# Patient Record
Sex: FEMALE
[De-identification: names, ages, dates, MRNs, and addresses within clinical notes are randomized per-mention and may not be internally consistent; named-entity substitution may affect disease eponyms.]

---

## 2024-04-23 PROBLEM — Z00.00 ENCOUNTER FOR PREVENTIVE HEALTH EXAMINATION: Status: ACTIVE | Noted: 2024-04-23

## 2024-05-13 ENCOUNTER — NON-APPOINTMENT (OUTPATIENT)
Age: 34
End: 2024-05-13

## 2024-05-13 ENCOUNTER — APPOINTMENT (OUTPATIENT)
Dept: NEUROSURGERY | Facility: CLINIC | Age: 34
End: 2024-05-13

## 2024-05-14 ENCOUNTER — APPOINTMENT (OUTPATIENT)
Dept: NEUROSURGERY | Facility: CLINIC | Age: 34
End: 2024-05-14
Payer: SELF-PAY

## 2024-05-14 DIAGNOSIS — G93.2 BENIGN INTRACRANIAL HYPERTENSION: ICD-10-CM

## 2024-05-14 PROCEDURE — EDU01: CPT

## 2024-05-14 NOTE — REASON FOR VISIT
[Home] : at home, [unfilled] , at the time of the educational consult. [Medical Office: ([facility])___] : at the medical office located in  [Participant] : the participant [Self] : self [New Patient Visit] : a new patient visit [FreeTextEntry1] : IIH

## 2024-05-14 NOTE — ASSESSMENT
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 CHAVA DINH suffers from IIH with failure/ intolerance of medical management. She has the classic appearance of lateral venous sinus stenosis on imaging and based on that and her symptoms, I recommended venous sinus stenting as an appropriate minimally invasive surgical treatment. I discussed with the patient my extensive personal experience with venous sinus stenting for IIH and we also discussed the recent literature that supports venous stenting as an alternative less invasive and cheaper option to CSF shunt. Stenting also has better outcomes in carefully selected patients.   We discussed the procedure that has two components. The first part consists of catheter angiogram and manometry to assess the degree of stenosis and the trans-stenotic gradient. This part is typically performed with the patient awake. The second part consists of the stenting part and is performed under general anesthesia.  The risks, benefits and alternatives of the procedure were discussed with the patient in detail. In my personal experience, the risks are very rare, but the possibility is not zero. Risks include stroke, brain hemorrhage, any type of disability (facial or extremity weakness, facial or extremity numbness, speech difficulties, blindness) and death. There are also possible complications related to the incisions such as infection, pain, swelling and bleeding.  The patient is aware that venous sinus stenting requires dual antiplatelet therapy for 1-month post-stent (typically aspirin 325 mg daily and clopidogrel 75 mg daily) and antiplatelet monotherapy (typically aspirin 325 md daily) for additional 11 months post-stent.   Recommendations: Updated MRI and MRV Head w/wo for procedure planning Schedule Venous Sinus Stenting Will Need ASA and Plavix for 5 Days Prior to the Procedure

## 2024-05-14 NOTE — HISTORY OF PRESENT ILLNESS
[de-identified] : Ms. DINH is a pleasant 33 year old female who presents today with a chief complaint of idiopathic intracranial hypertension.  She was diagnosed in 11/2022 during work up of TVOs, diplopia and positional headaches.  She had a lumbar puncture with an opening pressure "in the 20s".  She needed a blood patch post LP.     Currently: Meds - Diamox 500mg-1000mg daily; Which she is tolerating well. Pulsatile Tinnitus - RIGHT ear Headaches - positional, worse with laying flat and in the morning Vision - + Papilledema at last visit 2/2024  She states that she has tried to wean off Diamox at least twice over the last 2 years but headaches and vision symptoms return.

## 2024-09-09 RX ORDER — CLOPIDOGREL BISULFATE 75 MG/1
75 TABLET, FILM COATED ORAL DAILY
Qty: 40 | Refills: 0 | Status: ACTIVE | COMMUNITY
Start: 2024-09-09 | End: 1900-01-01

## 2024-09-09 RX ORDER — ASPIRIN 325 MG/1
325 TABLET, FILM COATED ORAL DAILY
Qty: 90 | Refills: 2 | Status: ACTIVE | COMMUNITY
Start: 2024-09-09 | End: 1900-01-01

## 2024-09-09 RX ORDER — ACETAZOLAMIDE 250 MG
TABLET ORAL
Refills: 0 | Status: ACTIVE | COMMUNITY

## 2024-09-16 ENCOUNTER — NON-APPOINTMENT (OUTPATIENT)
Age: 34
End: 2024-09-16

## 2024-09-17 ENCOUNTER — APPOINTMENT (OUTPATIENT)
Dept: NEUROSURGERY | Facility: CLINIC | Age: 34
End: 2024-09-17
Payer: COMMERCIAL

## 2024-09-17 ENCOUNTER — OUTPATIENT (OUTPATIENT)
Dept: OUTPATIENT SERVICES | Facility: HOSPITAL | Age: 34
LOS: 1 days | End: 2024-09-17
Payer: COMMERCIAL

## 2024-09-17 ENCOUNTER — NON-APPOINTMENT (OUTPATIENT)
Age: 34
End: 2024-09-17

## 2024-09-17 VITALS
RESPIRATION RATE: 18 BRPM | SYSTOLIC BLOOD PRESSURE: 140 MMHG | OXYGEN SATURATION: 99 % | HEIGHT: 63 IN | HEART RATE: 98 BPM | TEMPERATURE: 98 F | WEIGHT: 130.07 LBS | DIASTOLIC BLOOD PRESSURE: 80 MMHG

## 2024-09-17 VITALS
SYSTOLIC BLOOD PRESSURE: 127 MMHG | WEIGHT: 130 LBS | HEIGHT: 63 IN | OXYGEN SATURATION: 98 % | BODY MASS INDEX: 23.04 KG/M2 | HEART RATE: 86 BPM | DIASTOLIC BLOOD PRESSURE: 88 MMHG

## 2024-09-17 DIAGNOSIS — G93.2 BENIGN INTRACRANIAL HYPERTENSION: ICD-10-CM

## 2024-09-17 DIAGNOSIS — Z90.711 ACQUIRED ABSENCE OF UTERUS WITH REMAINING CERVICAL STUMP: Chronic | ICD-10-CM

## 2024-09-17 DIAGNOSIS — Z98.890 OTHER SPECIFIED POSTPROCEDURAL STATES: Chronic | ICD-10-CM

## 2024-09-17 LAB
ANION GAP SERPL CALC-SCNC: 15 MMOL/L — SIGNIFICANT CHANGE UP (ref 5–17)
APTT BLD: 33 SEC — SIGNIFICANT CHANGE UP (ref 24.5–35.6)
BLD GP AB SCN SERPL QL: POSITIVE — SIGNIFICANT CHANGE UP
BUN SERPL-MCNC: 17 MG/DL — SIGNIFICANT CHANGE UP (ref 7–23)
CALCIUM SERPL-MCNC: 9.4 MG/DL — SIGNIFICANT CHANGE UP (ref 8.4–10.5)
CHLORIDE SERPL-SCNC: 106 MMOL/L — SIGNIFICANT CHANGE UP (ref 96–108)
CO2 SERPL-SCNC: 20 MMOL/L — LOW (ref 22–31)
CREAT SERPL-MCNC: 0.7 MG/DL — SIGNIFICANT CHANGE UP (ref 0.5–1.3)
EGFR: 116 ML/MIN/1.73M2 — SIGNIFICANT CHANGE UP
GLUCOSE SERPL-MCNC: 96 MG/DL — SIGNIFICANT CHANGE UP (ref 70–99)
HCT VFR BLD CALC: 41 % — SIGNIFICANT CHANGE UP (ref 34.5–45)
HGB BLD-MCNC: 13.9 G/DL — SIGNIFICANT CHANGE UP (ref 11.5–15.5)
INR BLD: 0.94 RATIO — SIGNIFICANT CHANGE UP (ref 0.85–1.18)
MCHC RBC-ENTMCNC: 29.6 PG — SIGNIFICANT CHANGE UP (ref 27–34)
MCHC RBC-ENTMCNC: 33.9 GM/DL — SIGNIFICANT CHANGE UP (ref 32–36)
MCV RBC AUTO: 87.4 FL — SIGNIFICANT CHANGE UP (ref 80–100)
NRBC # BLD: 0 /100 WBCS — SIGNIFICANT CHANGE UP (ref 0–0)
PLATELET # BLD AUTO: 309 K/UL — SIGNIFICANT CHANGE UP (ref 150–400)
POTASSIUM SERPL-MCNC: 3.5 MMOL/L — SIGNIFICANT CHANGE UP (ref 3.5–5.3)
POTASSIUM SERPL-SCNC: 3.5 MMOL/L — SIGNIFICANT CHANGE UP (ref 3.5–5.3)
PROTHROM AB SERPL-ACNC: 10.4 SEC — SIGNIFICANT CHANGE UP (ref 9.5–13)
RBC # BLD: 4.69 M/UL — SIGNIFICANT CHANGE UP (ref 3.8–5.2)
RBC # FLD: 12.4 % — SIGNIFICANT CHANGE UP (ref 10.3–14.5)
RH IG SCN BLD-IMP: NEGATIVE — SIGNIFICANT CHANGE UP
SODIUM SERPL-SCNC: 141 MMOL/L — SIGNIFICANT CHANGE UP (ref 135–145)
WBC # BLD: 7.1 K/UL — SIGNIFICANT CHANGE UP (ref 3.8–10.5)
WBC # FLD AUTO: 7.1 K/UL — SIGNIFICANT CHANGE UP (ref 3.8–10.5)

## 2024-09-17 PROCEDURE — 85730 THROMBOPLASTIN TIME PARTIAL: CPT

## 2024-09-17 PROCEDURE — 85610 PROTHROMBIN TIME: CPT

## 2024-09-17 PROCEDURE — 86880 COOMBS TEST DIRECT: CPT

## 2024-09-17 PROCEDURE — 85027 COMPLETE CBC AUTOMATED: CPT

## 2024-09-17 PROCEDURE — 86922 COMPATIBILITY TEST ANTIGLOB: CPT

## 2024-09-17 PROCEDURE — 86077 PHYS BLOOD BANK SERV XMATCH: CPT

## 2024-09-17 PROCEDURE — 86900 BLOOD TYPING SEROLOGIC ABO: CPT

## 2024-09-17 PROCEDURE — 86850 RBC ANTIBODY SCREEN: CPT

## 2024-09-17 PROCEDURE — 80048 BASIC METABOLIC PNL TOTAL CA: CPT

## 2024-09-17 PROCEDURE — 86901 BLOOD TYPING SEROLOGIC RH(D): CPT

## 2024-09-17 PROCEDURE — 86870 RBC ANTIBODY IDENTIFICATION: CPT

## 2024-09-17 PROCEDURE — G0463: CPT

## 2024-09-17 PROCEDURE — 99203 OFFICE O/P NEW LOW 30 MIN: CPT

## 2024-09-17 PROCEDURE — 86905 BLOOD TYPING RBC ANTIGENS: CPT

## 2024-09-17 PROCEDURE — 36415 COLL VENOUS BLD VENIPUNCTURE: CPT

## 2024-09-17 NOTE — H&P PST ADULT - NSICDXPASTMEDICALHX_GEN_ALL_CORE_FT
PAST MEDICAL HISTORY:  Ectopic pregnancy     History of TMJ disorder     Torsion of left ovary and ovarian pedicle

## 2024-09-17 NOTE — H&P PST ADULT - NEGATIVE ENMT SYMPTOMS
Health Maintenance Due   Topic Date Due   • Pneumococcal Vaccine 0-64 (1 of 1 - PPSV23) 10/11/1997   • Influenza Vaccine (1) 09/01/2020   • Cervical Cancer Screening  12/23/2020       Patient is due for topics as listed above but is not proceeding with Immunization(s) Influenza and Pneumococcal and Cervical cancer screening at this time. Will schedule her cervical cancer screening for the end of December.           no nasal obstruction/no post-nasal discharge/no nose bleeds/no gum bleeding/no dry mouth/no throat pain/no dysphagia

## 2024-09-17 NOTE — HISTORY OF PRESENT ILLNESS
[de-identified] : Ms. DINH is a pleasant 34 year old female who presents today with a chief complaint of idiopathic intracranial hypertension.  She was diagnosed in 11/2022 during work up of TVOs, diplopia and positional headaches.  She had a lumbar puncture with an opening pressure "in the 20s".  She needed a blood patch post LP.     Currently: Meds - Diamox 500mg-1000mg daily; Which she is tolerating well. Pulsatile Tinnitus - RIGHT ear Headaches - positional, worse with laying flat and in the morning Vision - + Papilledema at last visit 2/2024  She states that she has tried to wean off Diamox at least twice over the last 2 years but headaches and vision symptoms return.

## 2024-09-17 NOTE — HISTORY OF PRESENT ILLNESS
[de-identified] : Ms. DINH is a pleasant 34 year old female who presents today with a chief complaint of idiopathic intracranial hypertension.  She was diagnosed in 11/2022 during work up of TVOs, diplopia and positional headaches.  She had a lumbar puncture with an opening pressure "in the 20s".  She needed a blood patch post LP.     Currently: Meds - Diamox 500mg-1000mg daily; Which she is tolerating well. Pulsatile Tinnitus - RIGHT ear Headaches - positional, worse with laying flat and in the morning Vision - + Papilledema at last visit 2/2024  She states that she has tried to wean off Diamox at least twice over the last 2 years but headaches and vision symptoms return.

## 2024-09-17 NOTE — H&P PST ADULT - PROBLEM SELECTOR PLAN 1
Venous Sinus stenting   CBC BMP T&S PT PTT INR  Instructions diet and chlorhexidine soap provided and reviewed   All questions answered   Will continue on aspirin and plavix throughout the jeremie op period as instructed by Dr Reynoso

## 2024-09-17 NOTE — ASSESSMENT
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 CHAVA DINH suffers from IIH with failure/ intolerance of medical management. She has the classic appearance of lateral venous sinus stenosis on imaging and based on that and her symptoms, I recommended venous sinus stenting as an appropriate minimally invasive surgical treatment. I discussed with the patient my extensive personal experience with venous sinus stenting for IIH and we also discussed the recent literature that supports venous stenting as an alternative less invasive and cheaper option to CSF shunt. Stenting also has better outcomes in carefully selected patients.   We discussed the procedure that has two components. The first part consists of catheter angiogram and manometry to assess the degree of stenosis and the trans-stenotic gradient. This part is typically performed with the patient awake. The second part consists of the stenting part and is performed under general anesthesia.  The risks, benefits and alternatives of the procedure were discussed with the patient in detail. In my personal experience, the risks are very rare, but the possibility is not zero. Risks include stroke, brain hemorrhage, any type of disability (facial or extremity weakness, facial or extremity numbness, speech difficulties, blindness) and death. There are also possible complications related to the incisions such as infection, pain, swelling and bleeding.  The patient is aware that venous sinus stenting requires dual antiplatelet therapy for 1-month post-stent (typically aspirin 325 mg daily and clopidogrel 75 mg daily) and antiplatelet monotherapy (typically aspirin 325 md daily) for additional 11 months post-stent.   Plan: Venous Sinus Stenting Tomorrow Continue ASA and Plavix for 5 Days Prior to the Procedure
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 CHAVA DINH suffers from IIH with failure/ intolerance of medical management. She has the classic appearance of lateral venous sinus stenosis on imaging and based on that and her symptoms, I recommended venous sinus stenting as an appropriate minimally invasive surgical treatment. I discussed with the patient my extensive personal experience with venous sinus stenting for IIH and we also discussed the recent literature that supports venous stenting as an alternative less invasive and cheaper option to CSF shunt. Stenting also has better outcomes in carefully selected patients.   We discussed the procedure that has two components. The first part consists of catheter angiogram and manometry to assess the degree of stenosis and the trans-stenotic gradient. This part is typically performed with the patient awake. The second part consists of the stenting part and is performed under general anesthesia.  The risks, benefits and alternatives of the procedure were discussed with the patient in detail. In my personal experience, the risks are very rare, but the possibility is not zero. Risks include stroke, brain hemorrhage, any type of disability (facial or extremity weakness, facial or extremity numbness, speech difficulties, blindness) and death. There are also possible complications related to the incisions such as infection, pain, swelling and bleeding.  The patient is aware that venous sinus stenting requires dual antiplatelet therapy for 1-month post-stent (typically aspirin 325 mg daily and clopidogrel 75 mg daily) and antiplatelet monotherapy (typically aspirin 325 md daily) for additional 11 months post-stent.   Plan: Venous Sinus Stenting Tomorrow Continue ASA and Plavix for 5 Days Prior to the Procedure
[id number]

## 2024-09-17 NOTE — H&P PST ADULT - NSICDXFAMILYHX_GEN_ALL_CORE_FT
FAMILY HISTORY:  Mother  Still living? Yes, Estimated age: Age Unknown  FH: coronary artery disease, Age at diagnosis: Age Unknown

## 2024-09-17 NOTE — H&P PST ADULT - HISTORY OF PRESENT ILLNESS
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   PMHx partial hysterectomy, placenta accreta, left ovarian torsion, TMJ (surgery 2007), ectopic pregnancy 2018, breast augmentation, Lasix eye.    Patient feels fine - chest pain sob / fevers / chills.   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 Patient has a history of idiopathic intracranial hypertension, diagnosed in 2022 after experiencing positional headaches. Experiences pulsatile tinnitus in the right ear with headaches that worsen when she changes positions. 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 PMHx includes partial hysterectomy (2019), placenta accreta (required blood transfusions s/p delivery 2016 & 2019 - laisha antibodies), left ovarian torsion, TMJ (surgery 2007), ectopic pregnancy (2018), breast augmentation and Lasix eye. Patient was prescribed 325 aspirin and 75 mg Plavix prior to procedure and was instructed to continue throughout the jeremie-op period.  Patient feels "fine" today denies - chest pain sob / fevers / chills.             ?  Currently:  Meds - Diamox 500mg-1000mg daily; Which she is tolerating well.  Pulsatile Tinnitus - RIGHT ear  Headaches - positional, worse with laying flat and in the morning  Vision - + Papilledema at last visit 2/2024  ?  She states that she has tried to wean off Diamox at least twice over the last 2 years but headaches and vision symptoms return.

## 2024-09-17 NOTE — H&P PST ADULT - FUNCTIONAL STATUS
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

## 2024-09-17 NOTE — H&P PST ADULT - ASSESSMENT
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     CAPRINI SCORE    AGE RELATED RISK FACTORS                                                             [ ] Age 41-60 years                                            (1 Point)  [ ] Age: 61-74 years                                           (2 Points)                 [ ] Age= 75 years                                                (3 Points)             DISEASE RELATED RISK FACTORS                                                       [ ] Edema in the lower extremities                 (1 Point)                     [ ] Varicose veins                                               (1 Point)                                 [ ] BMI > 25 Kg/m2                                            (1 Point)                                  [ ] Serious infection (ie PNA)                            (1 Point)                     [ ] Lung disease ( COPD, Emphysema)            (1 Point)                                                                          [ ] Acute myocardial infarction                         (1 Point)                  [ ] Congestive heart failure (in the previous month)  (1 Point)         [ ] Inflammatory bowel disease                            (1 Point)                  [ ] Central venous access, PICC or Port               (2 points)       (within the last month)                                                                [ ] Stroke (in the previous month)                        (5 Points)    [ ] Previous or present malignancy                       (2 points)                                                                                                                                                         HEMATOLOGY RELATED FACTORS                                                         [ ] Prior episodes of VTE                                     (3 Points)                     [ ] Positive family history for VTE                      (3 Points)                  [ ] Prothrombin 53878 A                                     (3 Points)                     [ ] Factor V Leiden                                                (3 Points)                        [ ] Lupus anticoagulants                                      (3 Points)                                                           [ ] Anticardiolipin antibodies                              (3 Points)                                                       [ ] High homocysteine in the blood                   (3 Points)                                             [ ] Other congenital or acquired thrombophilia      (3 Points)                                                [ ] Heparin induced thrombocytopenia                  (3 Points)                                        MOBILITY RELATED FACTORS  [ ] Bed rest                                                         (1 Point)  [ ] Plaster cast                                                    (2 points)  [ ] Bed bound for more than 72 hours           (2 Points)    GENDER SPECIFIC FACTORS  [ ] Pregnancy or had a baby within the last month   (1 Point)  [ ] Post-partum < 6 weeks                                   (1 Point)  [ ] Hormonal therapy  or oral contraception   (1 Point)  [ ] History of pregnancy complications              (1 point)  [ ] Unexplained or recurrent              (1 Point)    OTHER RISK FACTORS                                           (1 Point)  [ ] BMI >40, smoking, diabetes requiring insulin, chemotherapy  blood transfusions and length of surgery over 2 hours    SURGERY RELATED RISK FACTORS  [ ]  Section within the last month     (1 Point)  [ ] Minor surgery                                                  (1 Point)  [ ] Arthroscopic surgery                                       (2 Points)  [ ] Planned major surgery lasting more            (2 Points)      than 45 minutes     [ ] Elective hip or knee joint replacement       (5 points)       surgery                                                TRAUMA RELATED RISK FACTORS  [ ] Fracture of the hip, pelvis, or leg                       (5 Points)  [ ] Spinal cord injury resulting in paralysis             (5 points)       (in the previous month)    [ ] Paralysis  (less than 1 month)                             (5 Points)  [ ] Multiple Trauma within 1 month                        (5 Points)    Total Score [        ]    Caprini Score 0-2: Low Risk, NO VTE prophylaxis required for most patients, encourage ambulation  Caprini Score 3-6: Moderate Risk , pharmacologic VTE prophylaxis is indicated for most patients (in the absence of contraindications)  Caprini Score Greater than or =7: High risk, pharmocologic VTE prophylaxis indicated for most patients (in the absence of contraindications)                                 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     CAPRINI SCORE    AGE RELATED RISK FACTORS                                                             [ ] Age 41-60 years                                            (1 Point)  [ ] Age: 61-74 years                                           (2 Points)                 [ ] Age= 75 years                                                (3 Points)             DISEASE RELATED RISK FACTORS                                                       [ ] Edema in the lower extremities                 (1 Point)                     [ ] Varicose veins                                               (1 Point)                                 [ ] BMI > 25 Kg/m2                                            (1 Point)                                  [ ] Serious infection (ie PNA)                            (1 Point)                     [ ] Lung disease ( COPD, Emphysema)            (1 Point)                                                                          [ ] Acute myocardial infarction                         (1 Point)                  [ ] Congestive heart failure (in the previous month)  (1 Point)         [ ] Inflammatory bowel disease                            (1 Point)                  [ ] Central venous access, PICC or Port               (2 points)       (within the last month)                                                                [ ] Stroke (in the previous month)                        (5 Points)    [ ] Previous or present malignancy                       (2 points)                                                                                                                                                         HEMATOLOGY RELATED FACTORS                                                         [ ] Prior episodes of VTE                                     (3 Points)                     [ ] Positive family history for VTE                      (3 Points)                  [ ] Prothrombin 01409 A                                     (3 Points)                     [ ] Factor V Leiden                                                (3 Points)                        [ ] Lupus anticoagulants                                      (3 Points)                                                           [ ] Anticardiolipin antibodies                              (3 Points)                                                       [ ] High homocysteine in the blood                   (3 Points)                                             [ ] Other congenital or acquired thrombophilia      (3 Points)                                                [ ] Heparin induced thrombocytopenia                  (3 Points)                                        MOBILITY RELATED FACTORS  [ ] Bed rest                                                         (1 Point)  [ ] Plaster cast                                                    (2 points)  [ ] Bed bound for more than 72 hours           (2 Points)    GENDER SPECIFIC FACTORS  [ ] Pregnancy or had a baby within the last month   (1 Point)  [ ] Post-partum < 6 weeks                                   (1 Point)  [ ] Hormonal therapy  or oral contraception   (1 Point)  [ ] History of pregnancy complications              (1 point)  [ ] Unexplained or recurrent              (1 Point)    OTHER RISK FACTORS                                           (1 Point)  [ ] BMI >40, smoking, diabetes requiring insulin, chemotherapy  blood transfusions and length of surgery over 2 hours    SURGERY RELATED RISK FACTORS  [ ]  Section within the last month     (1 Point)  [ ] Minor surgery                                                  (1 Point)  [ ] Arthroscopic surgery                                       (2 Points)  [ x] Planned major surgery lasting more            (2 Points)      than 45 minutes     [ ] Elective hip or knee joint replacement       (5 points)       surgery                                                TRAUMA RELATED RISK FACTORS  [ ] Fracture of the hip, pelvis, or leg                       (5 Points)  [ ] Spinal cord injury resulting in paralysis             (5 points)       (in the previous month)    [ ] Paralysis  (less than 1 month)                             (5 Points)  [ ] Multiple Trauma within 1 month                        (5 Points)    Total Score [  2      ]    Caprini Score 0-2: Low Risk, NO VTE prophylaxis required for most patients, encourage ambulation  Caprini Score 3-6: Moderate Risk , pharmacologic VTE prophylaxis is indicated for most patients (in the absence of contraindications)  Caprini Score Greater than or =7: High risk, pharmocologic VTE prophylaxis indicated for most patients (in the absence of contraindications)

## 2024-09-18 ENCOUNTER — APPOINTMENT (OUTPATIENT)
Dept: NEUROSURGERY | Facility: HOSPITAL | Age: 34
End: 2024-09-18

## 2024-09-18 ENCOUNTER — OUTPATIENT (OUTPATIENT)
Dept: INPATIENT UNIT | Facility: HOSPITAL | Age: 34
LOS: 1 days | End: 2024-09-18
Payer: COMMERCIAL

## 2024-09-18 ENCOUNTER — TRANSCRIPTION ENCOUNTER (OUTPATIENT)
Age: 34
End: 2024-09-18

## 2024-09-18 VITALS
DIASTOLIC BLOOD PRESSURE: 82 MMHG | WEIGHT: 130.07 LBS | RESPIRATION RATE: 18 BRPM | TEMPERATURE: 98 F | SYSTOLIC BLOOD PRESSURE: 119 MMHG | HEART RATE: 88 BPM | HEIGHT: 63 IN | OXYGEN SATURATION: 99 %

## 2024-09-18 VITALS
RESPIRATION RATE: 11 BRPM | OXYGEN SATURATION: 98 % | SYSTOLIC BLOOD PRESSURE: 93 MMHG | HEART RATE: 85 BPM | DIASTOLIC BLOOD PRESSURE: 66 MMHG

## 2024-09-18 DIAGNOSIS — G93.2 BENIGN INTRACRANIAL HYPERTENSION: ICD-10-CM

## 2024-09-18 DIAGNOSIS — Z90.711 ACQUIRED ABSENCE OF UTERUS WITH REMAINING CERVICAL STUMP: Chronic | ICD-10-CM

## 2024-09-18 DIAGNOSIS — Z98.890 OTHER SPECIFIED POSTPROCEDURAL STATES: Chronic | ICD-10-CM

## 2024-09-18 PROCEDURE — 86901 BLOOD TYPING SEROLOGIC RH(D): CPT

## 2024-09-18 PROCEDURE — 36226 PLACE CATH VERTEBRAL ART: CPT

## 2024-09-18 PROCEDURE — C1876: CPT

## 2024-09-18 PROCEDURE — 86900 BLOOD TYPING SEROLOGIC ABO: CPT

## 2024-09-18 PROCEDURE — C1769: CPT

## 2024-09-18 PROCEDURE — C1887: CPT

## 2024-09-18 PROCEDURE — C1894: CPT

## 2024-09-18 PROCEDURE — 86850 RBC ANTIBODY SCREEN: CPT

## 2024-09-18 PROCEDURE — 75870 VEIN X-RAY SKULL: CPT

## 2024-09-18 PROCEDURE — C1760: CPT

## 2024-09-18 PROCEDURE — 36223 PLACE CATH CAROTID/INOM ART: CPT

## 2024-09-18 PROCEDURE — 37238 OPEN/PERQ PLACE STENT SAME: CPT

## 2024-09-18 PROCEDURE — 36012 PLACE CATHETER IN VEIN: CPT

## 2024-09-18 PROCEDURE — C1757: CPT

## 2024-09-18 RX ORDER — FLU VACCINE TS 2012-2013(5YR+) 45MCG/.5ML
0.5 VIAL (ML) INTRAMUSCULAR ONCE
Refills: 0 | Status: DISCONTINUED | OUTPATIENT
Start: 2024-09-18 | End: 2024-09-18

## 2024-09-18 RX ORDER — METHYLPREDNISOLONE 4 MG
1 TABLET ORAL
Qty: 21 | Refills: 0
Start: 2024-09-18

## 2024-09-18 RX ORDER — ASPIRIN 81 MG
1 TABLET, DELAYED RELEASE (ENTERIC COATED) ORAL
Refills: 0 | DISCHARGE

## 2024-09-18 RX ORDER — OXYCODONE AND ACETAMINOPHEN 7.5; 325 MG/1; MG/1
1 TABLET ORAL
Qty: 16 | Refills: 0
Start: 2024-09-18 | End: 2024-09-21

## 2024-09-18 RX ORDER — OXYCODONE HYDROCHLORIDE 5 MG/1
1 TABLET ORAL
Qty: 10 | Refills: 0
Start: 2024-09-18

## 2024-09-18 RX ORDER — KETOROLAC TROMETHAMINE 30 MG/ML
30 INJECTION, SOLUTION INTRAMUSCULAR ONCE
Refills: 0 | Status: DISCONTINUED | OUTPATIENT
Start: 2024-09-18 | End: 2024-09-18

## 2024-09-18 RX ORDER — ACETAZOLAMIDE 250 MG
1 TABLET ORAL
Refills: 0 | DISCHARGE

## 2024-09-18 RX ORDER — HYDROMORPHONE HYDROCHLORIDE 2 MG/1
0.2 TABLET ORAL ONCE
Refills: 0 | Status: DISCONTINUED | OUTPATIENT
Start: 2024-09-18 | End: 2024-09-18

## 2024-09-18 RX ADMIN — HYDROMORPHONE HYDROCHLORIDE 0.2 MILLIGRAM(S): 2 TABLET ORAL at 11:34

## 2024-09-18 RX ADMIN — KETOROLAC TROMETHAMINE 30 MILLIGRAM(S): 30 INJECTION, SOLUTION INTRAMUSCULAR at 11:40

## 2024-09-18 RX ADMIN — KETOROLAC TROMETHAMINE 30 MILLIGRAM(S): 30 INJECTION, SOLUTION INTRAMUSCULAR at 10:15

## 2024-09-18 NOTE — CHART NOTE - NSCHARTNOTEFT_GEN_A_CORE
Interventional Neuro Radiology  Pre-Procedure Note    This is a 34 year old female with history of idiopathic intracranial hypertension, diagnosed in 2022 after experiencing positional headaches. Experiences pulsatile tinnitus in the right ear with headaches that worsen when she changes positions. 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 PMHx includes partial hysterectomy (2019), placenta accreta (required blood transfusions s/p delivery 2016 & 2019 - laisha antibodies), left ovarian torsion, TMJ (surgery 2007), ectopic pregnancy (2018), breast augmentation and Lasix eye. Patient on 325 aspirin and 75 mg Plavix.     Neuro Exam: Awake and alert, oriented x3, fluent, normal naming and repetition, follows 3 step commands. Extraocular movements intact, no nystagmus, visual fields full, face symmetric, tongue midline. No drift, 5/5 power x 4 extremities. Normal sensation to LT. Normal finger-to-nose and rapid alternating movements.    PAST MEDICAL & SURGICAL HISTORY:  History of TMJ disorder  Ectopic pregnancy  Torsion of left ovary and ovarian pedicle  History of augmentation of both breasts  S/P partial hysterectomy      Social History:   Denies tobacco use    FAMILY HISTORY:  No pertinent family history    Allergies:   penicillin (Anaphylaxis)      Current Medications:   influenza   Vaccine 0.5 milliLiter(s) IntraMuscular once  · 	Diamox Sequels 500 mg oral capsule, extended release: Last Dose Taken:  , 1 cap(s) orally 2 times a day  · 	aspirin 325 mg oral tablet: Last Dose Taken:  , 1 tab(s) orally once a day 9/13  · 	Plavix 75 mg oral tablet: Last Dose Taken:  , 1 tab(s) orally once a day since 9/13      Labs:                         13.9   7.10  )-----------( 309      ( 17 Sep 2024 17:05 )             41.0       09-17    141  |  106  |  17  ----------------------------<  96  3.5   |  20[L]  |  0.70    Ca    9.4      17 Sep 2024 17:05          HCG: neg.     Blood Bank: 09-17-24  B  --  Negative      Assessment/Plan:   This is a 35yo female  presents with IIH. Patient presents to neuro-IR for selective cerebral venogram, angiography and VSS. Procedure/ risks/ benefits/ goals/ alternatives were explained. Risks include but are not limited to stroke/ vessel injury/ hemorrhage/ groin hematoma. All questions answered. Informed content obtained from patient. Consent placed in chart.       Daniela Hlae PA-C  [phone]

## 2024-09-18 NOTE — PRE-ANESTHESIA EVALUATION ADULT - NSANTHAIRWAYFT_ENT_ALL_CORE
Quality 111:Pneumonia Vaccination Status For Older Adults: Pneumococcal Vaccination Previously Received Detail Level: Detailed Quality 474: Zoster Vaccination Status: Shingrix Vaccination Administered or Previously Received No limitations in mouth opening  Neck FROM  TMD > 3FB

## 2024-09-18 NOTE — ASU DISCHARGE PLAN (ADULT/PEDIATRIC) - ACCOMPANIED BY
Family Alert and oriented to person, place, time/situation. normal mood and affect. no apparent risk to self or others.

## 2024-09-18 NOTE — CHART NOTE - NSCHARTNOTEFT_GEN_A_CORE
Interventional Neuro- Radiology   Procedure Note      Procedure: Selective Cerebral Angiography/ Venography/ Manometry/ Venous Sinus Stenting  Pre- Procedure Diagnosis: Venous sinus stenosis   Post- Procedure Diagnosis:     : Dr. Angeles MD  Fellow: Dr. Christine Randhawa   Physician Assistant: Daniela Hale PA-C    RN: Jada   Tech:     Anesthesia: Dr. Jain  (general anesthesia)    I/Os:  Fluids:  Yin: DTV   Contrast:  Estimated Blood Loss: <10cc    Preliminary Report:  Under general anesthesia, using a 8Fr short sheath to the right groin/ using a 5/4 sheath to the right radial artery examination of superior sagittal sinus, right transverse sinus, right sigmoid sinus via selective cerebral angiography/venography/manometry were performed and demonstrated ________. (Official note to follow).    Patient tolerated procedure well, vital signs stable, hemodynamically stable, no change in neurological status compared to baseline. Results discussed with neurosurgery/ patient and their family.  Radial sheath d/c'd, radial quickclot dressing placed at ____h; no bleeding, no hematoma. Patient transferred to IR recovery for further care/ monitoring.       Vascular access site:  Ultrasound guidance- yes  Fluoroscopy before procedure- yes   Fluoroscopy to confirm correct needle position after puncture and before advancing sheath- yes  Femoral artery angiography before sheath removal- yes  Closure device used- Vascade  Closure device appropriately deployed- yes  Manual pressure- held for 10minutes until hemostasis, no active bleeding or hematoma  Safeguard dressing applied- yes, applied at ___h. Interventional Neuro- Radiology   Procedure Note      Procedure: Selective Cerebral Angiography/ Venography/ Manometry/ Venous Sinus Stenting  Pre- Procedure Diagnosis: Venous sinus stenosis   Post- Procedure Diagnosis:  Right transverse sigmoid sinus tenting x2      : Dr. Angeles MD  Fellow: Dr. King   Physician Assistant: Daniela Hale PA-C    RN: Jada   Tech:  Rose     Anesthesia: Dr. Jain  (general anesthesia)    I/Os:  Fluids: 600cc   Yin: DTV   Contrast: 65cc   Estimated Blood Loss: <10cc    Preliminary Report:  Under general anesthesia, using a 8Fr short sheath to the right groin/ using a 5/4 sheath to the right radial artery examination of superior sagittal sinus, right transverse sinus, right sigmoid sinus via selective cerebral angiography/venography/manometry were performed, Right transverse sigmoid sinus tenting x2 . (Official note to follow).    Patient tolerated procedure well, vital signs stable, hemodynamically stable, no change in neurological status compared to baseline. Results discussed with neurosurgery/ patient and their family.  Radial sheath d/c'd, radial quickclot dressing placed at 940h; no bleeding, no hematoma. Patient transferred to IR recovery for further care/ monitoring.       Vascular access site:  Ultrasound guidance- yes  Fluoroscopy before procedure- yes   Fluoroscopy to confirm correct needle position after puncture and before advancing sheath- yes  Closure device used- Vascade  Closure device appropriately deployed- yes  Manual pressure- held for 10minutes until hemostasis, no active bleeding or hematoma  Safeguard dressing applied- yes, applied at 940h.

## 2024-09-18 NOTE — ASU DISCHARGE PLAN (ADULT/PEDIATRIC) - NS MD DC FALL RISK RISK
For information on Fall & Injury Prevention, visit: [email or web address] OR  [email or web address] OR  https://www.cdc.gov/steadi/patient.html

## 2024-09-18 NOTE — ASU DISCHARGE PLAN (ADULT/PEDIATRIC) - CARE PROVIDER_API CALL
Keisha Reynoso  Radiology  [address], [address]  [place] [address]  Phone: [phone]  Fax: [phone]  Follow Up Time:

## 2024-09-19 ENCOUNTER — APPOINTMENT (OUTPATIENT)
Dept: NEUROSURGERY | Facility: CLINIC | Age: 34
End: 2024-09-19
Payer: COMMERCIAL

## 2024-09-19 VITALS
DIASTOLIC BLOOD PRESSURE: 74 MMHG | HEART RATE: 77 BPM | HEIGHT: 63 IN | SYSTOLIC BLOOD PRESSURE: 106 MMHG | OXYGEN SATURATION: 99 % | BODY MASS INDEX: 23.04 KG/M2 | WEIGHT: 130 LBS

## 2024-09-19 DIAGNOSIS — Z78.9 OTHER SPECIFIED HEALTH STATUS: ICD-10-CM

## 2024-09-19 DIAGNOSIS — Z82.49 FAMILY HISTORY OF ISCHEMIC HEART DISEASE AND OTHER DISEASES OF THE CIRCULATORY SYSTEM: ICD-10-CM

## 2024-09-19 DIAGNOSIS — G93.2 BENIGN INTRACRANIAL HYPERTENSION: ICD-10-CM

## 2024-09-19 DIAGNOSIS — Z87.898 PERSONAL HISTORY OF OTHER SPECIFIED CONDITIONS: ICD-10-CM

## 2024-09-19 DIAGNOSIS — Z83.49 FAMILY HISTORY OF OTHER ENDOCRINE, NUTRITIONAL AND METABOLIC DISEASES: ICD-10-CM

## 2024-09-19 PROBLEM — N83.512 TORSION OF LEFT OVARY AND OVARIAN PEDICLE: Chronic | Status: ACTIVE | Noted: 2024-09-17

## 2024-09-19 PROBLEM — Z87.39 PERSONAL HISTORY OF OTHER DISEASES OF THE MUSCULOSKELETAL SYSTEM AND CONNECTIVE TISSUE: Chronic | Status: ACTIVE | Noted: 2024-09-17

## 2024-09-19 PROCEDURE — 99213 OFFICE O/P EST LOW 20 MIN: CPT

## 2024-09-19 RX ORDER — ACETAZOLAMIDE 500 MG/1
500 CAPSULE ORAL
Qty: 180 | Refills: 0 | Status: ACTIVE | COMMUNITY
Start: 2024-06-03

## 2024-09-19 RX ORDER — DICLOFENAC SODIUM 75 MG/1
75 TABLET, DELAYED RELEASE ORAL
Qty: 20 | Refills: 0 | Status: ACTIVE | COMMUNITY
Start: 2024-06-11

## 2024-09-19 RX ORDER — HYDROCODONE BITARTRATE AND ACETAMINOPHEN 5; 325 MG/1; MG/1
5-325 TABLET ORAL
Qty: 20 | Refills: 0 | Status: ACTIVE | COMMUNITY
Start: 2024-06-18

## 2024-09-19 RX ORDER — ONDANSETRON 4 MG/1
4 TABLET, ORALLY DISINTEGRATING ORAL
Qty: 12 | Refills: 0 | Status: ACTIVE | COMMUNITY
Start: 2024-06-11

## 2024-09-19 NOTE — HISTORY OF PRESENT ILLNESS
[FreeTextEntry1] : Ms. DINH is a pleasant 35yo female who presents today after successful venous sinus stenting on 9/18/24.  The patient went home the same day in good condition.  Her pulsatile tinnitus has completely resolved.  She reports mild right-sided headaches since the procedure that are described as different than the headaches she was experiencing prior to the procedure.  Her head feels much lighter.  She was discharged on a medrol dose pack and percocet prn headache.  Her puncture sites are healing well, not painful.    She is scheduled to follow up with her ophthalmologist in early October.  She continues to take Diamox 1000mg daily.   She remains compliant with ASA 325mg and Plavix 75mg daily.

## 2024-09-19 NOTE — ASSESSMENT
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   Plan: Continue ASA 325mg and Plavix 75mg Daily Continue Medrol Carl and Percocet for Headaches Follow Up Visit with Me in 4 weeks

## 2024-09-19 NOTE — PHYSICAL EXAM
[General Appearance - Alert] : alert [General Appearance - In No Acute Distress] : in no acute distress [General Appearance - Well-Appearing] : healthy appearing [FreeTextEntry1] : Right Groin Puncture site - DEREJE, Clean/dry/intact, no tenderness, no ecchymosis, no drainage; Right radial puncture site - DEREJE, clean/dry/intact, no tenderness, no ecchymosis, no drainage, cap refill <3 sec, no forearm tenderness or numbness/tingling in right fingers  [Oriented To Time, Place, And Person] : oriented to person, place, and time [Person] : oriented to person [Place] : oriented to place [Time] : oriented to time [Motor Tone] : muscle tone was normal in all four extremities [Intact] : all motor groups within normal limits of strength and tone bilaterally [Sclera] : the sclera and conjunctiva were normal [Outer Ear] : the ears and nose were normal in appearance [Neck Appearance] : the appearance of the neck was normal [] : no respiratory distress [Abnormal Walk] : normal gait [Skin Color & Pigmentation] : normal skin color and pigmentation

## 2024-10-25 ENCOUNTER — APPOINTMENT (OUTPATIENT)
Dept: NEUROSURGERY | Facility: CLINIC | Age: 34
End: 2024-10-25
Payer: COMMERCIAL

## 2024-10-25 DIAGNOSIS — G93.2 BENIGN INTRACRANIAL HYPERTENSION: ICD-10-CM

## 2024-10-25 PROCEDURE — 99212 OFFICE O/P EST SF 10 MIN: CPT

## 2024-12-17 ENCOUNTER — APPOINTMENT (OUTPATIENT)
Dept: NEUROSURGERY | Facility: CLINIC | Age: 34
End: 2024-12-17

## 2025-02-04 ENCOUNTER — APPOINTMENT (OUTPATIENT)
Dept: NEUROSURGERY | Facility: CLINIC | Age: 35
End: 2025-02-04
Payer: COMMERCIAL

## 2025-02-04 DIAGNOSIS — G93.2 BENIGN INTRACRANIAL HYPERTENSION: ICD-10-CM

## 2025-02-04 PROCEDURE — 99212 OFFICE O/P EST SF 10 MIN: CPT | Mod: 95
